# Patient Record
Sex: FEMALE | Race: WHITE | NOT HISPANIC OR LATINO | URBAN - METROPOLITAN AREA
[De-identification: names, ages, dates, MRNs, and addresses within clinical notes are randomized per-mention and may not be internally consistent; named-entity substitution may affect disease eponyms.]

---

## 2018-06-19 ENCOUNTER — AMBULATORY - RIVER FALLS (OUTPATIENT)
Dept: FAMILY MEDICINE | Facility: CLINIC | Age: 53
End: 2018-06-19

## 2020-02-17 ENCOUNTER — ANESTHESIA - HEALTHEAST (OUTPATIENT)
Dept: SURGERY | Facility: HOSPITAL | Age: 55
End: 2020-02-17

## 2020-02-17 ENCOUNTER — SURGERY - HEALTHEAST (OUTPATIENT)
Dept: SURGERY | Facility: HOSPITAL | Age: 55
End: 2020-02-17

## 2021-06-04 VITALS — HEIGHT: 69 IN | WEIGHT: 186.29 LBS | BODY MASS INDEX: 27.59 KG/M2

## 2021-06-06 NOTE — ANESTHESIA PREPROCEDURE EVALUATION
Anesthesia Evaluation      No history of anesthetic complications     Airway   Mallampati: II  Neck ROM: full   Pulmonary - negative ROS    breath sounds clear to auscultation                         Cardiovascular - negative ROS  Exercise tolerance: > or = 4 METS  Rhythm: regular  Rate: normal,         Neuro/Psych - negative ROS     Endo/Other    (+) hypothyroidism (well controlled),   (-) no obesity     GI/Hepatic/Renal - negative ROS      Other findings: Spigelian hernia, s/f robotic assisted repair   Hgb 13.5      Dental    (+) caps                       Anesthesia Plan  Planned anesthetic: general endotracheal  GETA  Tylenol 1g PO in preop, magnesium gtt, ketamine 40mg post-induction for opioid sparing analgesia  HM PRN   Decadron 10, Zofran 4 for antiemesis   ASA 2     Anesthetic plan and risks discussed with: patient and spouse  Anesthesia plan special considerations: antiemetics,   Post-op plan: routine recovery

## 2021-06-06 NOTE — ANESTHESIA POSTPROCEDURE EVALUATION
Patient: Jackelin Joe  Procedure(s):  ROBOTIC ASSISTED VENTRAL HERNIA REPAIR (Right)  Anesthesia type: general    Patient location: Phase II Recovery  Last vitals:   Vitals Value Taken Time   /55 2/17/2020  5:30 PM   Temp 37.1  C (98.7  F) 2/17/2020  2:45 PM   Pulse 71 2/17/2020  5:35 PM   Resp 16 2/17/2020  4:00 PM   SpO2 100 % 2/17/2020  5:36 PM   Vitals shown include unvalidated device data.  Post vital signs: stable  Level of consciousness: awake and responds to simple questions  Post-anesthesia pain: pain controlled  Post-anesthesia nausea and vomiting: no  Pulmonary: unassisted, return to baseline  Cardiovascular: stable and blood pressure at baseline  Hydration: adequate  Anesthetic events: no    QCDR Measures:  ASA# 11 - Leeann-op Cardiac Arrest: ASA11B - Patient did NOT experience unanticipated cardiac arrest  ASA# 12 - Leeann-op Mortality Rate: ASA12B - Patient did NOT die  ASA# 13 - PACU Re-Intubation Rate: ASA13B - Patient did NOT require a new airway mgmt  ASA# 10 - Composite Anes Safety: ASA10A - No serious adverse event    Additional Notes:

## 2021-06-06 NOTE — ANESTHESIA CARE TRANSFER NOTE
Last vitals:   Vitals:    02/17/20 1054   BP:    Pulse:    Resp:    Temp: 37.1  C (98.8  F)   SpO2:    /64, HR 72, SpO2 99, resp 16    Patient's level of consciousness is drowsy  Spontaneous respirations: yes  Maintains airway independently: yes  Dentition unchanged: yes  Oropharynx: oropharynx clear of all foreign objects    QCDR Measures:  ASA# 20 - Surgical Safety Checklist: WHO surgical safety checklist completed prior to induction    PQRS# 430 - Adult PONV Prevention: 4558F - Pt received => 2 anti-emetic agents (different classes) preop & intraop  ASA# 8 - Peds PONV Prevention: NA - Not pediatric patient, not GA or 2 or more risk factors NOT present  PQRS# 424 - Leeann-op Temp Management: 4559F - At least one body temp DOCUMENTED => 35.5C or 95.9F within required timeframe  PQRS# 426 - PACU Transfer Protocol: - Transfer of care checklist used  ASA# 14 - Acute Post-op Pain: ASA14B - Patient did NOT experience pain >= 7 out of 10

## 2022-02-16 NOTE — NURSING NOTE
Phone Message    PCP: no PCP - labs only appt.    Time of call: 12:11pm message was left    Person calling: Taras - [facility]  Contact # : [phone]    MESSAGE: Calling because of a positive Lymes test. He would like to know how to get the clinical information related to pt's visit.    Last visit/reason: never seen in clinic    1:32pm Called and spoke with Taras. Informed him pt was never seen here. She brought in orders from an outside source. I did give him Dr Kaplan name and number as he ordered the lab work.Russel called today from [facility] and wanted to know where order came from for the Lymes test.  Discussed that order came from Dr. Mills.  I explained I had no other information on patient.  He verbalized understanding.